# Patient Record
Sex: MALE | Race: WHITE | Employment: UNEMPLOYED | URBAN - METROPOLITAN AREA
[De-identification: names, ages, dates, MRNs, and addresses within clinical notes are randomized per-mention and may not be internally consistent; named-entity substitution may affect disease eponyms.]

---
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
## 2021-03-12 ENCOUNTER — TELEPHONE (OUTPATIENT)
Dept: VASCULAR SURGERY | Age: 21
End: 2021-03-12

## 2021-03-15 ENCOUNTER — OFFICE VISIT (OUTPATIENT)
Dept: VASCULAR SURGERY | Age: 21
End: 2021-03-15
Payer: COMMERCIAL

## 2021-03-15 ENCOUNTER — HOSPITAL ENCOUNTER (OUTPATIENT)
Dept: CARDIOLOGY | Age: 21
Discharge: HOME | End: 2021-03-15
Payer: COMMERCIAL

## 2021-03-15 VITALS — HEIGHT: 68 IN | BODY MASS INDEX: 27.28 KG/M2 | WEIGHT: 180 LBS

## 2021-03-15 DIAGNOSIS — I82.A12 ACUTE DEEP VEIN THROMBOSIS (DVT) OF AXILLARY VEIN OF LEFT UPPER EXTREMITY (HCC): ICD-10-CM

## 2021-03-15 DIAGNOSIS — I80.8 SUPERFICIAL THROMBOPHLEBITIS OF LEFT UPPER EXTREMITY: ICD-10-CM

## 2021-03-15 DIAGNOSIS — I82.A22 CHRONIC DEEP VEIN THROMBOSIS (DVT) OF AXILLARY VEIN OF LEFT UPPER EXTREMITY (HCC): Primary | ICD-10-CM

## 2021-03-15 PROCEDURE — 93971 EXTREMITY STUDY: CPT

## 2021-03-15 PROCEDURE — 99213 OFFICE O/P EST LOW 20 MIN: CPT | Performed by: SURGERY

## 2021-03-15 RX ORDER — ASPIRIN 81 MG/1
81 TABLET, CHEWABLE ORAL DAILY
Qty: 30 TABLET | Refills: 3 | COMMUNITY
Start: 2021-03-15

## 2021-03-15 NOTE — PROGRESS NOTES
[facility] Heart & Vascular Lab - [facility]    This is a pre read worksheet - prior to official physician interpretation    Michael Grant  2000  Date of study: 3/15/21    Indication for study:  Arm Pain and Swelling, Hx of axillary venous thrombus  Study : Left Upper Extremity Venous Duplex Examination    Duplex examination of the superficial cephalic and basilic and the deep brachial, axillary, subclavian and internal jugular veins of the LEFT upper extremity identifies spontaneous flow. There is chronic- appearing, non- occlusive thrombus in the mid basilic vein, appears to be scar tissue.       Additional comments: Negative DVT

## 2021-03-15 NOTE — PROGRESS NOTES
Vascular Surgery Outpatient Consultation    Reason for Consult: Left upper extremity DVT    PCP : Jasmin Peralta MD   Hematology : Dr. Kelly Berger    Stop eliquis  Start asa 81 mg daily. Aware of potential recurrence      HISTORY OF PRESENT ILLNESS:    The patient is a 22 y.o. male who is here in fu in regards to a left upper extremity DVT which was noted on venous ultrasound 9/29/2020. There was noted to be thrombus in the left axillary vein and also thrombus in the left basilic vein. He was started on Eliquis per Dr. Lydia Carrera his orthopedic surgeon at that time. He first noted left arm swelling in 8/2020 after he was running. He continued to have issues and was seen on 8/12/20 had cta of L UE and venous us both of which showed no significant findings. He continues to have significant sxs and had MRI per his primary which showed no significant findings. He was than referred to Dr. Lydia Carrera who ordered another us which was done at [facility] 9/29/20 which noted above findings. At his first visit 11/9/2020 he no longer had significant sxs of swelling, edema or pain at rest.  When he lifted he notes some sweling but no significant pain associated. The swelling resolves after an hour or two. At today's follow-up visit 3/15/2021 he continues to note some left arm swelling with lifting but has noted improvement. He continues to be asymptomatic    They do not have a hx of DVT in the past.  They do not have a family hx of DVT or clotting disorders in the past.      He did see Dr. Trisha Stock from hematology who did not feel he had any clotting disorder. In 3/2020 he slid into a base and hurt his shoulder but after a couple days had no issues. He does lift on a regular basis.        Past Medical History:        Diagnosis Date    Arm DVT (deep venous thromboembolism), acute ([address]      Past Surgical History:    None previous    Current Medications:   Current Outpatient Medications   Medication Sig Dispense Refill    ELIQUIS 5 MG TABS tablet Take 5 mg by mouth 2 times daily       No current facility-administered medications for this visit. Allergies:  Patient has no known allergies.   Social History     Socioeconomic History    Marital status: Single     Spouse name: Not on file    Number of children: Not on file    Years of education: Not on file    Highest education level: Not on file   Occupational History    Not on file   Social Needs    Financial resource strain: Not on file    Food insecurity     Worry: Not on file     Inability: Not on file    Transportation needs     Medical: Not on file     Non-medical: Not on file   Tobacco Use    Smoking status: Never Smoker    Smokeless tobacco: Never Used   Substance and Sexual Activity    Alcohol use: Yes     Comment: socially    Drug use: Never    Sexual activity: Not on file   Lifestyle    Physical activity     Days per week: Not on file     Minutes per session: Not on file    Stress: Not on file   Relationships    Social connections     Talks on phone: Not on file     Gets together: Not on file     Attends Mor[employer] service: Not on file     Active member of club or organization: Not on file     Attends meetings of clubs or organizations: Not on file     Relationship status: Not on file    Intimate partner violence     Fear of current or ex partner: Not on file     Emotionally abused: Not on file     Physically abused: Not on file     Forced sexual activity: Not on file   Other Topics Concern    Not on file   Social History Narrative    Not on file     Family hx  No dvt, pe, clotting disorders    Labs  Lab Results   Component Value Date    WBC 7.1 08/12/2020    HGB 14.0 08/12/2020    HCT 40.8 08/12/2020     08/12/2020    PROTIME 12.6 (H) 08/12/2020    INR 1.1 08/12/2020    APTT 34.8 08/12/2020    K 4.7 08/12/2020    BUN 17 08/12/2020    CREATININE 1.0 08/12/2020     PHYSICAL EXAM:    Ht 5' 8\" (1.727 m)   Wt 180 lb (81.6 kg) BMI 27.37 kg/m²   CONSTITUTIONAL:   Awake, alert, cooperative  PSYCHIATRIC :  Oriented to time, place and person     Appropriate insight to disease process  EYES: Lids and lashes normal  ENT:  External ears and nose without lesions   Hearing deficits not noted  NECK: Supple, symmetrical, trachea midline   Carotid bruit not noted  LUNGS:  No increased work of breathing                 Clear to auscultation  CARDIOVASCULAR:  regular rate and rhythm   ABDOMEN:  soft, non-distended, non-tender   Aorta is not palpable  SKIN:   Normal skin color   Texture and turgor normal, no induration  EXTREMITIES:   R UE 5/5 strength   No significant edema, swelling  L UE 5/5 strength   No significant edema, swelling  R LE Edema absent  L LE Edema absent    RADIOLOGY:    A/P L UE DVT  · Ultrasound notes interval resolution of left axillary DVT, but persistence of superficial thrombophlebitis of the basilic vein  · Etiology is likely unknown  · Therapeutic anticoagulation for 6 months which he has completed  · Stop Eliquis  · Start aspirin 81 mg daily for lifetime  · Recommended using a sleeve for his left upper extremity when he lifts to try and keep the swelling down  · No indication for placement of IVC Filter  · Elevate Left UE while in bed or sitting  · F/U as outpatient as needed  · Call if patient develops worsening of swelling or wounds  · discussed with patient pathophysiology of DVT, PE, and thrombophlebitis   · All ?s answered    Gissell Gunderson

## 2021-03-15 NOTE — PATIENT INSTRUCTIONS
Patient Education        Deep Vein Thrombosis: Care Instructions  Overview     A deep vein thrombosis (DVT) is a blood clot in certain veins, usually in the legs, pelvis, or arms. Blood clots in these veins need to be treated because they can get bigger, break loose, and travel through the bloodstream to the lungs. A blood clot in a lung can be life-threatening. The doctor may have given you a blood thinner (anticoagulant). A blood thinner can stop the blood clot from growing larger and prevent new clots from forming. You will need to take a blood thinner for at least 3 months. The doctor has checked you carefully, but problems can develop later. If you notice any problems or new symptoms, get medical treatment right away. Follow-up care is a key part of your treatment and safety. Be sure to make and go to all appointments, and call your doctor if you are having problems. It's also a good idea to know your test results and keep a list of the medicines you take. How can you care for yourself at home? · Take your medicines exactly as prescribed. Call your doctor if you think you are having a problem with your medicine. · If you are taking a blood thinner, be sure you get instructions about how to take your medicine safely. Blood thinners can cause serious bleeding problems. · Try to walk several times a day. · Wear compression stockings if your doctor recommends them. These stockings are tighter at the feet than on the legs. They may reduce pain and swelling in your legs. But there are different types of stockings, and they need to fit right. So your doctor will recommend what you need. · When you sit, use a pillow to raise the arm or leg that has the blood clot. Try to keep it above the level of your heart. When should you call for help? Call 911 anytime you think you may need emergency care.  For example, call if:    · You passed out (lost consciousness).     · You have symptoms of a blood clot in your lung (called a pulmonary embolism). These include:  ? Sudden chest pain. ? Trouble breathing. ? Coughing up blood. Call your doctor now or seek immediate medical care if:    · You have new or worse trouble breathing.     · You are dizzy or lightheaded, or you feel like you may faint.     · You have symptoms of a blood clot in your arm or leg. These may include:  ? Pain in the arm, calf, back of the knee, thigh, or groin. ? Redness and swelling in the arm, leg, or groin. Watch closely for changes in your health, and be sure to contact your doctor if:    · You do not get better as expected. Where can you learn more? Go to https://IndigoBoomeb.ChinaHR.com. org and sign in to your Apokalyyis account. Enter B683 in the [employer] box to learn more about \"Deep Vein Thrombosis: Care Instructions. \"     If you do not have an account, please click on the \"Sign Up Now\" link. Current as of: March 4, 2020               Content Version: 12.8  © 2006-2021 Healthwise, Incorporated. Care instructions adapted under license by [facility] ([facility]). If you have questions about a medical condition or this instruction, always ask your healthcare professional. [place] [address] any warranty or liability for your use of this information.

## 2021-06-29 ENCOUNTER — TELEPHONE (OUTPATIENT)
Dept: VASCULAR SURGERY | Age: 21
End: 2021-06-29

## 2021-06-29 DIAGNOSIS — M79.602 PAIN OF LEFT UPPER EXTREMITY: Primary | ICD-10-CM

## 2021-06-29 NOTE — TELEPHONE ENCOUNTER
Notified patient's mother that Dr. Carmencita Cosby would like ultrasound scheduled prior to his visit. Scheduled L upper extremity venous ultrasound on 6-30-21 at 8:30 am in our office.

## 2021-06-29 NOTE — TELEPHONE ENCOUNTER
Spoke with patient's mother. Patient has had left arm pain x 3 days. Scheduled appt with Dr. Jhon Lau on 7-1-21.

## 2021-06-30 ENCOUNTER — HOSPITAL ENCOUNTER (OUTPATIENT)
Dept: CARDIOLOGY | Age: 21
Discharge: HOME | End: 2021-06-30
Payer: COMMERCIAL

## 2021-06-30 DIAGNOSIS — M79.602 PAIN OF LEFT UPPER EXTREMITY: ICD-10-CM

## 2021-06-30 PROCEDURE — 93971 EXTREMITY STUDY: CPT

## 2021-06-30 NOTE — PROGRESS NOTES
[facility] - [facility]    This is a pre read worksheet - prior to official physician interpretation    Nelson Juarez  2000  Date of study: 6/30/21    Indication for study:  Arm Pain and Swelling  Study : Left Upper Extremity Venous Duplex Examination    Duplex examination of the superficial cephalic and basilic and the deep brachial, axillary, subclavian and internal jugular veins of the LEFT upper extremity identifies spontaneous flow. Mid basilic vein is positive for chronic- appearing, non- occlusive thrombus.        Additional comments: Negative DVT

## 2021-07-01 ENCOUNTER — OFFICE VISIT (OUTPATIENT)
Dept: VASCULAR SURGERY | Age: 21
End: 2021-07-01
Payer: COMMERCIAL

## 2021-07-01 VITALS — WEIGHT: 181 LBS | BODY MASS INDEX: 26.81 KG/M2 | HEIGHT: 69 IN

## 2021-07-01 DIAGNOSIS — M79.602 PAIN OF LEFT UPPER EXTREMITY: Primary | ICD-10-CM

## 2021-07-01 DIAGNOSIS — I80.8 SUPERFICIAL THROMBOPHLEBITIS OF LEFT UPPER EXTREMITY: ICD-10-CM

## 2021-07-01 DIAGNOSIS — I82.A22 CHRONIC DEEP VEIN THROMBOSIS (DVT) OF AXILLARY VEIN OF LEFT UPPER EXTREMITY (HCC): Chronic | ICD-10-CM

## 2021-07-01 PROCEDURE — 99214 OFFICE O/P EST MOD 30 MIN: CPT | Performed by: SURGERY

## 2021-07-01 NOTE — PROGRESS NOTES
Vascular Surgery Outpatient Followup    PCP : Petey Cuevas MD   Hematology : Dr. Michel Price:    The patient is a 22 y.o. male who is here in [place] in regards to hx of left upper extremity DVT, superficial thrombophlebitis. Katarzyna Akhtar He had acutely developed pain in medial upper arm 6/23/21 he was working at his job and his arm got better after 2 hours. He sets up graduation tents, and he continued to work. Since that time he has not been lifting again but he has been working and has had no recurrent episodes. He has been trying to switch his arm use while at work. He has not had previous episodes since he has last seen me. He did not notice more than his normal amount of swelling with acute episode. He was first noted on venous ultrasound 9/29/2020 to have L UE DVT, thrombus in the left axillary vein and also thrombus in the left basilic vein. He was started on Eliquis per Dr. Gucci Bledsoe his orthopedic surgeon at that time. He first noted left arm swelling in 8/2020 after he was running. He continued to have issues and was seen on 8/12/20 had cta of L UE and venous us both of which showed no significant findings. He continued to have significant sxs and had MRI per his primary which showed no significant findings. He was than referred to Dr. Gucci Bledsoe who ordered another us which was done at [facility] 9/29/20 which noted above findings. He was initially seen in the office 11/9/2020 at which time he no longer had significant sxs of swelling, edema or pain at rest.  When he lifted he notes some sweling but no significant pain associated. The swelling resolves after an hour or two. They do not have a family hx of DVT or clotting disorders in the past.      He did see Dr. Jan Escobar from hematology who did not feel he had any clotting disorder. In 3/2020 he slid into a base and hurt his shoulder but after a couple days had no issues. He does lift on a regular basis. Past Medical History:        Diagnosis Date    Arm DVT (deep venous thromboembolism), acute (HCC)     Chronic deep vein thrombosis (DVT) of axillary vein of left upper extremity ([facility] [address] 11/9/2020     Past Surgical History:    None previous    Current Medications:   Current Outpatient Medications   Medication Sig Dispense Refill    aspirin (ASPIRIN CHILDRENS) 81 MG chewable tablet Take 1 tablet by mouth daily 30 tablet 3     No current facility-administered medications for this visit. Allergies:  Patient has no known allergies. Social History     Socioeconomic History    Marital status: Single     Spouse name: Not on file    Number of children: Not on file    Years of education: Not on file    Highest education level: Not on file   Occupational History    Not on file   Tobacco Use    Smoking status: Never Smoker    Smokeless tobacco: Never Used   Vaping Use    Vaping Use: Never used   Substance and Sexual Activity    Alcohol use: Yes     Comment: socially    Drug use: Never    Sexual activity: Not on file   Other Topics Concern    Not on file   Social History Narrative    Not on file     Social Determinants of Health     Financial Resource Strain:     Difficulty of Paying Living Expenses:    Food Insecurity:     Worried About Running Out of Food in the Last Year:     [address] in the Last Year:    Transportation Needs:     Lack of Transportation (Medical):      Lack of Transportation (Non-Medical):    Physical Activity:     Days of Exercise per Week:     Minutes of Exercise per Session:    Stress:     Feeling of Stress :    Social Connections:     Frequency of Communication with Friends and Family:     Frequency of Social Gatherings with Friends and Family:     Attends Zoroastrianism Services:     Active Member of Clubs or Organizations:     Attends Club or Organization Meetings:     Marital Status:    Intimate Partner Violence:     Fear of Current or Ex-Partner:     Emotionally Abused:     Physically Abused:     Sexually Abused:      Family hx  No dvt, pe, clotting disorders    Labs  Lab Results   Component Value Date    WBC 7.1 08/12/2020    HGB 14.0 08/12/2020    HCT 40.8 08/12/2020     08/12/2020    PROTIME 12.6 (H) 08/12/2020    INR 1.1 08/12/2020    APTT 34.8 08/12/2020    K 4.7 08/12/2020    BUN 17 08/12/2020    CREATININE 1.0 08/12/2020     PHYSICAL EXAM:    Ht 5' 9\" (1.753 m)   Wt 181 lb (82.1 kg)   BMI 26.73 kg/m²   CONSTITUTIONAL:   Awake, alert, cooperative  PSYCHIATRIC :  Oriented to time, place and person     Appropriate insight to disease process  EYES: Lids and lashes normal  ENT:  External ears and nose without lesions   Hearing deficits not noted  NECK: Supple, symmetrical, trachea midline   Carotid bruit not noted  LUNGS:  No increased work of breathing                 Clear to auscultation  CARDIOVASCULAR:  regular rate and rhythm   ABDOMEN:  soft, non-distended, non-tender   Aorta is not palpable  SKIN:   Normal skin color   Texture and turgor normal, no induration  EXTREMITIES:   R UE 5/5 strength   No significant edema, swelling  L UE 5/5 strength   No significant edema, swelling  R LE Edema absent  L LE Edema absent    RADIOLOGY:    A/P hx L UE DVT         Chronic superficial thrombophlebitis  · Ultrasound notes persistence of superficial thrombophlebitis of the basilic vein  · No new dvt  · Us findings stable as compared to previous study  · Etiology is likely unknown  · Cotninue aspirin 81 mg daily for lifetime  · Good chance one episode of soreness not related to his hx of dvt, and superficial thrombophlebitis  · Likely more related to work he was doing  · No indication for placement of IVC Filter  · Elevate Left UE while in bed or sitting  · F/U as outpatient as needed  · Call if patient develops worsening of swelling or wounds  · discussed with patient pathophysiology of DVT, PE, and thrombophlebitis   · All ?s answered    Kristal CASTILLO Francisco Abarca MD

## 2021-07-02 NOTE — PATIENT INSTRUCTIONS
Patient Education        Deep Vein Thrombosis: Care Instructions  Overview     A deep vein thrombosis (DVT) is a blood clot in certain veins, usually in the legs, pelvis, or arms. Blood clots in these veins need to be treated because they can get bigger, break loose, and travel through the bloodstream to the lungs. A blood clot in a lung can be life-threatening. The doctor may have given you a blood thinner (anticoagulant). A blood thinner can stop the blood clot from growing larger and prevent new clots from forming. You will need to take a blood thinner for at least 3 months. The doctor has checked you carefully, but problems can develop later. If you notice any problems or new symptoms, get medical treatment right away. Follow-up care is a key part of your treatment and safety. Be sure to make and go to all appointments, and call your doctor if you are having problems. It's also a good idea to know your test results and keep a list of the medicines you take. How can you care for yourself at home? · Take your medicines exactly as prescribed. Call your doctor if you think you are having a problem with your medicine. · If you are taking a blood thinner, be sure you get instructions about how to take your medicine safely. Blood thinners can cause serious bleeding problems. · Try to walk several times a day. · Wear compression stockings if your doctor recommends them. These stockings are tighter at the feet than on the legs. They may reduce pain and swelling in your legs. But there are different types of stockings, and they need to fit right. So your doctor will recommend what you need. · When you sit, use a pillow to raise the arm or leg that has the blood clot. Try to keep it above the level of your heart. When should you call for help? Call 911 anytime you think you may need emergency care.  For example, call if:    · You passed out (lost consciousness).     · You have symptoms of a blood clot in your lung (called a pulmonary embolism). These include:  ? Sudden chest pain. ? Trouble breathing. ? Coughing up blood. Call your doctor now or seek immediate medical care if:    · You have new or worse trouble breathing.     · You are dizzy or lightheaded, or you feel like you may faint.     · You have symptoms of a blood clot in your arm or leg. These may include:  ? Pain in the arm, calf, back of the knee, thigh, or groin. ? Redness and swelling in the arm, leg, or groin. Watch closely for changes in your health, and be sure to contact your doctor if:    · You do not get better as expected. Where can you learn more? Go to https://Divshoteb.Lendino. org and sign in to your Xianguo account. Enter E213 in the CurrencyFair box to learn more about \"Deep Vein Thrombosis: Care Instructions. \"     If you do not have an account, please click on the \"Sign Up Now\" link. Current as of: March 4, 2020               Content Version: 12.8  © 2006-2021 Healthwise, Incorporated. Care instructions adapted under license by [facility] ([facility]). If you have questions about a medical condition or this instruction, always ask your healthcare professional. [place] [address] any warranty or liability for your use of this information.

## 2022-01-14 ENCOUNTER — TELEPHONE (OUTPATIENT)
Dept: VASCULAR SURGERY | Age: 22
End: 2022-01-14

## 2022-01-14 NOTE — TELEPHONE ENCOUNTER
Called to confirmed appointment for 1-17-22 at 845 a.m, left message with date, time, and phone number for patient.

## 2022-01-16 ENCOUNTER — TELEPHONE (OUTPATIENT)
Dept: CARDIOLOGY CLINIC | Age: 22
End: 2022-01-16
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
## 2022-01-18 ENCOUNTER — TELEPHONE (OUTPATIENT)
Dept: VASCULAR SURGERY | Age: 22
End: 2022-01-18
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
## 2022-07-08 ENCOUNTER — TELEPHONE (OUTPATIENT)
Dept: VASCULAR SURGERY | Age: 22
End: 2022-07-08

## 2022-07-11 ENCOUNTER — OFFICE VISIT (OUTPATIENT)
Dept: VASCULAR SURGERY | Age: 22
End: 2022-07-11
Payer: COMMERCIAL

## 2022-07-11 VITALS — WEIGHT: 165 LBS | HEIGHT: 68 IN | BODY MASS INDEX: 25.01 KG/M2

## 2022-07-11 DIAGNOSIS — Z86.718 HISTORY OF DVT (DEEP VEIN THROMBOSIS): ICD-10-CM

## 2022-07-11 DIAGNOSIS — I80.8 SUPERFICIAL THROMBOPHLEBITIS OF LEFT UPPER EXTREMITY: Primary | ICD-10-CM

## 2022-07-11 PROCEDURE — 99213 OFFICE O/P EST LOW 20 MIN: CPT | Performed by: PHYSICIAN ASSISTANT

## 2022-07-11 NOTE — PROGRESS NOTES
Vascular Surgery Outpatient Followup    PCP : Consuelo Mulligan MD   Hematology : Dr. Roxy Norton:    The patient is a 25 y.o. male who is here in fu in regards to hx of left upper extremity DVT, superficial thrombophlebitis. He was first noted on venous ultrasound 9/29/2020 to have L UE DVT, thrombus in the left axillary vein and also thrombus in the left basilic vein. He was started on Eliquis per Dr. Marianne Borja his orthopedic surgeon at that time. He first noted left arm swelling in 8/2020 after he was running. He continued to have issues and was seen on 8/12/20 had cta of L UE and venous us both of which showed no significant findings. He continued to have significant sxs and had MRI per his primary which showed no significant findings. He was than referred to Dr. Marianne Borja who ordered another us which was done at [facility] 9/29/20 which noted above findings. He was initially seen in the office 11/9/2020 at which time he no longer had significant sxs of swelling, edema or pain at rest.  When he lifted he notes some swelling but no significant pain associated. The swelling resolves after an hour or two. They do not have a family hx of DVT or clotting disorders in the past.      He did see Dr. Elijah Bernal from hematology who did not feel he had any clotting disorder. He no longer follows with him. His symptoms of swelling and pain are intermittent. He states sometimes when he wakes in the morning his L arm is achy, 3/10. He does not have pain throughout the day. He was wearing a compression sleeve to the arm and it did help with swelling but he no longer wears. He is taking daily 81 mg asa.      Past Medical History:        Diagnosis Date    Arm DVT (deep venous thromboembolism), acute (HCC)     Chronic deep vein thrombosis (DVT) of axillary vein of left upper extremity ([address] 11/9/2020     Past Surgical History:    None previous    Current Medications:   Current Outpatient Medications   Medication Sig Dispense Refill    aspirin (ASPIRIN CHILDRENS) 81 MG chewable tablet Take 1 tablet by mouth daily 30 tablet 3     No current facility-administered medications for this visit. Allergies:  Patient has no known allergies. Social History     Socioeconomic History    Marital status: Single     Spouse name: Not on file    Number of children: Not on file    Years of education: Not on file    Highest education level: Not on file   Occupational History    Not on file   Tobacco Use    Smoking status: Never Smoker    Smokeless tobacco: Never Used   Vaping Use    Vaping Use: Never used   Substance and Sexual Activity    Alcohol use: Yes     Comment: socially    Drug use: Never    Sexual activity: Not on file   Other Topics Concern    Not on file   Social History Narrative    Not on file     Social Determinants of Health     Financial Resource Strain:     Difficulty of Paying Living Expenses: Not on file   Food Insecurity:     Worried About [address] in the Last Year: Not on file    Cheryl of Food in the Last Year: Not on file   Transportation Needs:     Lack of Transportation (Medical): Not on file    Lack of Transportation (Non-Medical):  Not on file   Physical Activity:     Days of Exercise per Week: Not on file    Minutes of Exercise per Session: Not on file   Stress:     Feeling of Stress : Not on file   Social Connections:     Frequency of Communication with Friends and Family: Not on file    Frequency of Social Gatherings with Friends and Family: Not on file    Attends Jainism Services: Not on file    Active Member of Clubs or Organizations: Not on file    Attends Club or Organization Meetings: Not on file    Marital Status: Not on file   Intimate Partner Violence:     Fear of Current or Ex-Partner: Not on file    Emotionally Abused: Not on file    Physically Abused: Not on file    Sexually Abused: Not on file   Housing Stability: [place], [place]